# Patient Record
Sex: FEMALE | Race: BLACK OR AFRICAN AMERICAN | Employment: PART TIME | URBAN - METROPOLITAN AREA
[De-identification: names, ages, dates, MRNs, and addresses within clinical notes are randomized per-mention and may not be internally consistent; named-entity substitution may affect disease eponyms.]

---

## 2017-03-04 ENCOUNTER — ED HISTORICAL/CONVERTED ENCOUNTER (OUTPATIENT)
Dept: OTHER | Age: 22
End: 2017-03-04

## 2017-11-05 ENCOUNTER — IP HISTORICAL/CONVERTED ENCOUNTER (OUTPATIENT)
Dept: OTHER | Age: 22
End: 2017-11-05

## 2021-03-01 LAB
CHLAMYDIA, EXTERNAL: NEGATIVE
HBSAG, EXTERNAL: NEGATIVE
HIV, EXTERNAL: NEGATIVE
N. GONORRHEA, EXTERNAL: NEGATIVE
RPR, EXTERNAL: NON REACTIVE
RUBELLA, EXTERNAL: NORMAL
TYPE, ABO & RH, EXTERNAL: NORMAL

## 2021-04-28 ENCOUNTER — HOSPITAL ENCOUNTER (OUTPATIENT)
Dept: PERINATAL CARE | Age: 26
Discharge: HOME | End: 2021-04-28
Attending: OBSTETRICS & GYNECOLOGY
Payer: OTHER GOVERNMENT

## 2021-04-28 PROCEDURE — 76811 OB US DETAILED SNGL FETUS: CPT | Performed by: OBSTETRICS & GYNECOLOGY

## 2021-08-17 LAB — GRBS, EXTERNAL: NEGATIVE

## 2021-08-21 ENCOUNTER — HOSPITAL ENCOUNTER (EMERGENCY)
Age: 26
Discharge: HOME | End: 2021-08-21
Admitting: OBSTETRICS & GYNECOLOGY

## 2021-08-21 VITALS
TEMPERATURE: 98.6 F | HEIGHT: 67 IN | RESPIRATION RATE: 16 BRPM | OXYGEN SATURATION: 99 % | HEART RATE: 85 BPM | BODY MASS INDEX: 39.24 KG/M2 | WEIGHT: 250 LBS | DIASTOLIC BLOOD PRESSURE: 66 MMHG | SYSTOLIC BLOOD PRESSURE: 128 MMHG

## 2021-08-21 PROBLEM — O99.213 OBESITY COMPLICATING PREGNANCY IN THIRD TRIMESTER: Status: ACTIVE | Noted: 2021-08-21

## 2021-08-21 PROBLEM — O36.8130 DECREASED FETAL MOVEMENTS IN THIRD TRIMESTER: Status: ACTIVE | Noted: 2021-08-21

## 2021-08-21 PROBLEM — Z3A.37 37 WEEKS GESTATION OF PREGNANCY: Status: ACTIVE | Noted: 2021-08-21

## 2021-08-21 PROCEDURE — 59025 FETAL NON-STRESS TEST: CPT

## 2021-08-21 PROCEDURE — 99282 EMERGENCY DEPT VISIT SF MDM: CPT

## 2021-08-21 PROCEDURE — 75810000275 HC EMERGENCY DEPT VISIT NO LEVEL OF CARE

## 2021-08-21 PROCEDURE — 76815 OB US LIMITED FETUS(S): CPT

## 2021-08-21 NOTE — DISCHARGE SUMMARY
Obstetrical Discharge Summary     Name: Sridhar Kiran MRN: [id number]  SSN: [id number]    YOB: 1995  Age: 31 y.o. Sex: female      Allergies: Fish containing products and Indocin [indomethacin]    Admit Date: 8/21/2021    Discharge Date: 8/21/2021     Admitting Physician: Radha Hoffman     Attending Physician:Dr SIS Larose  * Admission Diagnoses: Active Hospital Problems    Diagnosis Date Noted    Decreased fetal movements in third trimester 08/21/2021     Priority: 1 - One     NST is reactive  AFV is normal  Fetal growth is good  BPP 10/10  I reassured the patient. Will discharge home  She will follow up as scheduled. She will return if fetal movements do not return to normal.      Obesity complicating pregnancy in third trimester 08/21/2021     Priority: 2 - Two    Body mass index 39.0-39.9, adult 08/21/2021     Priority: 2 - Two    37 weeks gestation of pregnancy 08/21/2021     Priority: 3 - Three       * Discharge Diagnoses: Active Hospital Problems    Diagnosis Date Noted    Decreased fetal movements in third trimester 08/21/2021     Priority: 1 - One     NST is reactive  AFV is normal  Fetal growth is good  BPP 10/10  I reassured the patient. Will discharge home  She will follow up as scheduled. She will return if fetal movements do not return to normal.      Obesity complicating pregnancy in third trimester 08/21/2021     Priority: 2 - Two    Body mass index 39.0-39.9, adult 08/21/2021     Priority: 2 - Two    37 weeks gestation of pregnancy 08/21/2021     Priority: 3 - Three         Additional Diagnoses:   Problem List as of 8/21/2021 Date Reviewed: 8/21/2021        Codes Class Noted - Resolved    Decreased fetal movements in third trimester ICD-10-CM: O36.8130  ICD-9-CM: 655.73  8/21/2021 - Present    Overview Signed 8/21/2021  3:51 PM by Jovon Rodriguez MD     NST is reactive  AFV is normal  Fetal growth is good  BPP 10/10  I reassured the patient.   Will discharge home  She will follow up as scheduled. She will return if fetal movements do not return to normal.             Obesity complicating pregnancy in third trimester ICD-10-CM: O99.213  ICD-9-CM: 649.13  8/21/2021 - Present        Body mass index 39.0-39.9, adult ICD-10-CM: Z68.39  ICD-9-CM: V85.39  8/21/2021 - Present        37 weeks gestation of pregnancy ICD-10-CM: Z3A.37  ICD-9-CM: V22.2  8/21/2021 - Present           No results found for: ABORH, RUBELLAEXT, GRBSEXT, ABORHEXT, RUBELLAEXT, GRBSEXT    Immunization(s):   There is no immunization history on file for this patient. * Procedures:NST, US with BPP  * No surgery found *           * Discharge Condition: stable    * Hospital Course: NST & BPP     * Disposition: Home    Discharge Medications: There are no discharge medications for this patient. * Follow-up Care/Patient Instructions: Activity: Activity as tolerated  Diet: Regular Diet  Wound Care: None needed  Instructions: NST is reactive  AFV is normal  Fetal growth is good  BPP 10/10  I reassured the patient. Will discharge home  She will follow up as scheduled.   She will return if fetal movements do not return to normal.    Follow-up Information     Follow up With Specialties Details Why Contact Info    Unknown, Provider, MD    Patient not available to ask

## 2021-08-21 NOTE — DISCHARGE INSTRUCTIONS
Patient Education   Patient Education        Counting Your Baby's Kicks: Care Instructions  Your Care Instructions     Counting your baby's kicks is one way your doctor can tell that your baby is healthy. Most women--especially in a first pregnancy--feel their baby move for the first time between 16 and 22 weeks. The movement may feel like flutters rather than kicks. Your baby may move more at certain times of the day. When you are active, you may notice less kicking than when you are resting. At your prenatal visits, your doctor will ask whether the baby is active. In your last trimester, your doctor may ask you to count the number of times you feel your baby move. Follow-up care is a key part of your treatment and safety. Be sure to make and go to all appointments, and call your doctor if you are having problems. It's also a good idea to know your test results and keep a list of the medicines you take. How do you count fetal kicks? · A common method of checking your baby's movement is to count the number of kicks or moves you feel in 1 hour. Ten movements (such as kicks, flutters, or rolls) in 1 hour are normal. Some doctors suggest that you count in the morning until you get to 10 movements. Then you can quit for that day and start again the next day. · Pick your baby's most active time of day to count. This may be any time from morning to evening. · If you do not feel 10 movements in an hour, your baby may be sleeping. Wait for the next hour and count again. When should you call for help? Call your doctor now or seek immediate medical care if:    · You noticed that your baby has stopped moving or is moving much less than normal.   Watch closely for changes in your health, and be sure to contact your doctor if you have any problems. Where can you learn more?   Go to http://www.gray.com/  Enter [id number] in the search box to learn more about \"Counting Your Baby's Kicks: Care Instructions. \"  Current as of: October 8, 2020               Content Version: 12.8  © 0861-7442 [employer]. Care instructions adapted under license by [employer] (which disclaims liability or warranty for this information). If you have questions about a medical condition or this instruction, always ask your healthcare professional. [address] any warranty or liability for your use of this information. Early Stage of Labor at Home: Care Instructions  Overview     If you came to the hospital while in early labor, your doctor may ask you to labor at home until your contractions are stronger. Many women stay at home during early labor. This is often the longest part of the birthing process. It may last up to 2 to 3 days. Contractions are mild to moderate and shorter (about 30 to 45 seconds). You can usually keep talking during them. Contractions may also be irregular, about 5 to 20 minutes apart. They may even stop for a while. It helps to stay as relaxed as you can during this time. You can spend some or all of your early labor at home or anywhere else you may be comfortable. If you live far from the hospital or birthing center, you may want to think about going somewhere nearby so you can get back to the hospital quickly. For some women, there may be benefits to staying home during early labor, such as avoiding medicines or procedures. As labor progresses, you'll shift from early labor to active labor. During this time, contractions get more intense. They occur more often, about every 2 to 3 minutes. They also last longer, about 50 to 70 seconds. You will feel them even when you change positions and walk or move around. It may be hard to tell if you are in active labor. If you aren't sure, call your doctor or midwife. As your labor progresses, check in with your doctor or midwife about when to come back to the hospital or birthing center.  You may have special instructions if your water broke or you tested positive for group B strep. Follow-up care is a key part of your treatment and safety. Be sure to make and go to all appointments, and call your doctor if you are having problems. It's also a good idea to know your test results and keep a list of the medicines you take. How can you care for yourself at home? · Get support. Having a support person with you from early labor until after childbirth can have a positive effect on childbirth. · Find distractions. During early labor, you can walk, play cards, watch TV, or listen to music to help take your mind off your contractions. · Ask your partner, labor , or  for a massage. Shoulder and low back massage during contractions may ease your pain. Strong massage of the back muscles (counterpressure) during contractions may help relieve the pain of back labor. Tell your labor  exactly where to push and how hard to push. · Use imagery. This means using your imagination to decrease your pain. For instance, to help manage pain, picture your contractions as waves rolling over you. Picture a peaceful place, such as a beach or mountain stream, to help you relax between contractions. · Change positions during labor. Walking, kneeling, or sitting on a big rubber ball (birth ball) are good options. · Use focused breathing techniques. Breathing in a rhythm can distract you from pain. · Take a warm shower or bath. Warm water may ease pain and stress. When should you call for help? Call  911 anytime you think you may need emergency care. For example, call if:    · You passed out (lost consciousness).     · You have a seizure.     · You have severe vaginal bleeding.     · You have severe pain in your belly or pelvis that doesn't get better between contractions.     · You have had fluid gushing or leaking from your vagina and you know or think the umbilical cord is bulging into your vagina.  If this happens, immediately get down on your knees so your rear end (buttocks) is higher than your head. This will decrease the pressure on the cord until help arrives. Call your doctor now or seek immediate medical care if:    · You have new or worse signs of preeclampsia, such as:  ? Sudden swelling of your face, hands, or feet. ? New vision problems (such as dimness, blurring, or seeing spots). ? A severe headache.     · You have any vaginal bleeding.     · You have belly pain or cramping.     · You have a fever.     · You have had regular contractions (with or without pain) for an hour. This means that you have 8 or more within 1 hour or 4 or more in 20 minutes after you change your position and drink fluids.     · You have a sudden release of fluid from your vagina.     · You have low back pain or pelvic pressure that does not go away.     · You notice that your baby has stopped moving or is moving much less than normal.   Watch closely for changes in your health, and be sure to contact your doctor if you have any problems. Where can you learn more? Go to http://www.gray.com/  Enter [id number] in the search box to learn more about \"Early Stage of Labor at Home: Care Instructions. \"  Current as of: October 8, 2020               Content Version: 12.8  © 2006-2021 [employer]. Care instructions adapted under license by [employer] (which disclaims liability or warranty for this information). If you have questions about a medical condition or this instruction, always ask your healthcare professional. [place] [address] any warranty or liability for your use of this information.

## 2021-08-21 NOTE — PROGRESS NOTES
1539 This RN giving pt written and verbal d/c instructions. Pt verbalized understanding and ambulated off the unit in stable condition.

## 2021-08-21 NOTE — PROGRESS NOTES
1400: Patient arrived to L&D room 209, pt reports no fetal movement in the last 2 hours. Pt oriented to room and unit. Plan of care discussed with pt. Pt verbalized understanding. 1405: Pt reporting feeling the baby move. Pt given button to push when she feels fetal movement. Pt also given ice water to PO hydrate. 1445: Dr. Felder given update on EFM tracing with late decel x1. MD reviewing EFM tracing at this time. Per MD will continue to monitor pt for now. 1510: Dr. Felder at bedside to assess pt.     1530: BPP 8/8 completed by Dr. Felder with bedside ultrasound. MD stated pt can be discharged home at this time.

## 2021-08-21 NOTE — H&P
History & Physical    Name: Guillermo Barth MRN: [id number]  SSN: [id number]    YOB: 1995  Age: 31 y.o. Sex: female        Subjective:   Chief Complaint: Decreased fetal movement  Estimated Date of Delivery: None noted. OB History    Para Term  AB Living   2 1 1     1   SAB TAB Ectopic Molar Multiple Live Births             1      # Outcome Date GA Lbr Morris/2nd Weight Sex Delivery Anes PTL Lv   2 Current            1 Term  36w0d   F Vag-Spont EPI N RICKEY       Teresa [place], 31 y.o.,  ,  presents at Unknown, complaining of Decreased fetal movement. Today, she noticed decreased fetal movement. The fetus did not move for two hours. She tried to stimulate it, but only got a small response. She hears the fetal heart rate, and is reassured, but still the movements are not as strong as usual.  I reviewed the prenatal record. Prenatal course was normal. Please see prenatal records for details. The only other complaint is that her right had feels swollen, and two fingers itch. No rash is present. Her palms do not itch. Allergies   Allergen Reactions    Fish Containing Products Anaphylaxis    Indocin [Indomethacin] Hives and Swelling       Prior to Admission medications    Not on File       History reviewed. No pertinent past medical history. Past Surgical History:   Procedure Laterality Date    HX CYST REMOVAL Right 2018       Social History     Occupational History    Not on file   Tobacco Use    Smoking status: Never Smoker    Smokeless tobacco: Never Used   Substance and Sexual Activity    Alcohol use: Not Currently    Drug use: Never    Sexual activity: Not on file       History reviewed. No pertinent family history. Review of Systems: A comprehensive review of systems was negative except for that written in the HPI.     Objective:     Physical Exam:    Visit Vitals  /66   Pulse 85   Temp 98.6 °F (37 °C)   Resp 16   Ht 5' 7\" (1.702 m)   Wt 113.4 kg (250 lb)   SpO2 99%   BMI 39.16 kg/m²       General:   31 y.o.  female who appears to be in no acute distress. HEENT:   Normocephalic. Pupils are equal, round, and reactive to light. Extraocular movements are intact. Pharynx is clear. Neck:   Normal range of motion. No thyromegaly. Abdomen:   Soft, gravid, not tender, normal bowel sounds. No CVA tenderness   Leopold's: vertex   EFW 8 pounds   Uterine Activity:    Frequency: Occasional    Duration: 60 seconds    Intensity: Mild  Fetal Heart Rate:     Baseline: 150 bpm    Variability: Moderate    Accelerations: Present (15 x 15 bpm)    Decelerations: None  Category: 1   Nonstress test is reactive  US confirms a single live fetus, in cephalic presentation with spine to maternal left. Amniotic fluid volume is normal.  Placenta is anterior and clear of the cervix. BPD 8.90 CMS 36W 0D  HC  33.24 CM 37W 6D  AC  35.54 CM 39W 0D  FL 8.20 CM 42W 0D  EFW 3698 GRAMS 89 percentile    BPP: 10/10      Pelvic:    Membranes: Intact  Extremites:   Trace bilateral pedal edema. Full range of motion. Neuro:    Alert. Oriented. CN 2 - 12 intact. Motor and sensory exam grossly normal.      Prenatal Labs   No results found for: ABORH, RUBELLAEXT, GRBSEXT, HBSAGEXT, HIVEXT, RPREXT, GONNOEXT, CHLAMEXT, ABORHEXT, RUBELLAEXT, GRBSEXT, HBSAGEXT, HIVEXT, RPREXT, GONNOEXT, CHLAMEXT, ABORHEXT, RUBELLAEXT, GRBSEXT, HBSAGEXT, HIVEXT, RPREXT, GONNOEXT, CHLAMEXT  No results found for this or any previous visit (from the past 12 hour(s)). Assessment/Plan:     Active Hospital Problems    Diagnosis Date Noted    Decreased fetal movements in third trimester 2021     Priority: 1 - One     NST is reactive  AFV is normal  Fetal growth is good  BPP 10/10  I reassured the patient. Will discharge home  She will follow up as scheduled.   She will return if fetal movements do not return to normal.      Obesity complicating pregnancy in third trimester 2021 Priority: 2 - Two    Body mass index 39.0-39.9, adult 08/21/2021     Priority: 2 - Two    37 weeks gestation of pregnancy 08/21/2021     Priority: 3 - Three       Benedicto Veronica MD  8/21/2021

## 2021-08-31 ENCOUNTER — ANESTHESIA (OUTPATIENT)
Dept: LABOR AND DELIVERY | Age: 26
End: 2021-08-31

## 2021-08-31 ENCOUNTER — ANESTHESIA EVENT (OUTPATIENT)
Dept: LABOR AND DELIVERY | Age: 26
End: 2021-08-31

## 2021-08-31 ENCOUNTER — HOSPITAL ENCOUNTER (INPATIENT)
Age: 26
LOS: 3 days | Discharge: HOME | End: 2021-09-03
Attending: OBSTETRICS & GYNECOLOGY | Admitting: OBSTETRICS & GYNECOLOGY

## 2021-08-31 DIAGNOSIS — R52 POSTPARTUM PAIN: Primary | ICD-10-CM

## 2021-08-31 PROBLEM — O47.9 UTERINE CONTRACTIONS DURING PREGNANCY: Status: ACTIVE | Noted: 2021-08-31

## 2021-08-31 PROBLEM — Z34.90 PREGNANT: Status: ACTIVE | Noted: 2021-08-31

## 2021-08-31 LAB
ALT SERPL-CCNC: 16 U/L (ref 12–78)
APPEARANCE UR: CLEAR
AST SERPL-CCNC: 11 U/L (ref 15–37)
BACTERIA URNS QL MICRO: ABNORMAL /HPF
BILIRUB SERPL-MCNC: 0.5 MG/DL (ref 0.2–1)
BILIRUB UR QL: NEGATIVE
COLOR UR: ABNORMAL
COVID-19 RAPID TEST, COVR: NOT DETECTED
CREAT SERPL-MCNC: 0.46 MG/DL (ref 0.55–1.02)
CREAT UR-MCNC: 67 MG/DL
EPITH CASTS URNS QL MICRO: ABNORMAL /LPF
ERYTHROCYTE [DISTWIDTH] IN BLOOD BY AUTOMATED COUNT: 13.4 % (ref 11.5–14.5)
GLUCOSE UR STRIP.AUTO-MCNC: NEGATIVE MG/DL
HCT VFR BLD AUTO: 35.6 % (ref 35–47)
HGB BLD-MCNC: 11.4 G/DL (ref 11.5–16)
HGB UR QL STRIP: ABNORMAL
HYALINE CASTS URNS QL MICRO: ABNORMAL /LPF (ref 0–5)
KETONES UR QL STRIP.AUTO: NEGATIVE MG/DL
LDH SERPL L TO P-CCNC: 182 U/L (ref 81–246)
LEUKOCYTE ESTERASE UR QL STRIP.AUTO: ABNORMAL
MCH RBC QN AUTO: 28.2 PG (ref 26–34)
MCHC RBC AUTO-ENTMCNC: 32 G/DL (ref 30–36.5)
MCV RBC AUTO: 88.1 FL (ref 80–99)
NITRITE UR QL STRIP.AUTO: NEGATIVE
NRBC # BLD: 0 K/UL (ref 0–0.01)
NRBC BLD-RTO: 0 PER 100 WBC
PH UR STRIP: 6.5 [PH] (ref 5–8)
PLATELET # BLD AUTO: 192 K/UL (ref 150–400)
PMV BLD AUTO: 11.2 FL (ref 8.9–12.9)
PROT UR STRIP-MCNC: NEGATIVE MG/DL
PROT UR-MCNC: 13 MG/DL (ref 0–11.9)
PROT/CREAT UR-RTO: 0.2
RBC # BLD AUTO: 4.04 M/UL (ref 3.8–5.2)
RBC #/AREA URNS HPF: ABNORMAL /HPF (ref 0–5)
SOURCE, COVRS: NORMAL
SP GR UR REFRACTOMETRY: 1.01 (ref 1–1.03)
UA: UC IF INDICATED,UAUC: ABNORMAL
URATE SERPL-MCNC: 3.7 MG/DL (ref 2.6–6)
UROBILINOGEN UR QL STRIP.AUTO: 0.2 EU/DL (ref 0.2–1)
WBC # BLD AUTO: 11.1 K/UL (ref 3.6–11)
WBC URNS QL MICRO: ABNORMAL /HPF (ref 0–4)

## 2021-08-31 PROCEDURE — 65270000029 HC RM PRIVATE

## 2021-08-31 PROCEDURE — 75410000002 HC LABOR FEE PER 1 HR: Performed by: OBSTETRICS & GYNECOLOGY

## 2021-08-31 PROCEDURE — 83615 LACTATE (LD) (LDH) ENZYME: CPT

## 2021-08-31 PROCEDURE — 81001 URINALYSIS AUTO W/SCOPE: CPT

## 2021-08-31 PROCEDURE — 74011250636 HC RX REV CODE- 250/636: Performed by: ANESTHESIOLOGY

## 2021-08-31 PROCEDURE — 76060000078 HC EPIDURAL ANESTHESIA: Performed by: ANESTHESIOLOGY

## 2021-08-31 PROCEDURE — 82565 ASSAY OF CREATININE: CPT

## 2021-08-31 PROCEDURE — 85027 COMPLETE CBC AUTOMATED: CPT

## 2021-08-31 PROCEDURE — 77030014125 HC TY EPDRL BBMI -B: Performed by: ANESTHESIOLOGY

## 2021-08-31 PROCEDURE — 77030005513 HC CATH URETH FOL11 MDII -B

## 2021-08-31 PROCEDURE — 82247 BILIRUBIN TOTAL: CPT

## 2021-08-31 PROCEDURE — 36415 COLL VENOUS BLD VENIPUNCTURE: CPT

## 2021-08-31 PROCEDURE — 74011000250 HC RX REV CODE- 250: Performed by: ANESTHESIOLOGY

## 2021-08-31 PROCEDURE — 87635 SARS-COV-2 COVID-19 AMP PRB: CPT

## 2021-08-31 PROCEDURE — 82570 ASSAY OF URINE CREATININE: CPT

## 2021-08-31 PROCEDURE — 00HU33Z INSERTION OF INFUSION DEVICE INTO SPINAL CANAL, PERCUTANEOUS APPROACH: ICD-10-PCS | Performed by: ANESTHESIOLOGY

## 2021-08-31 PROCEDURE — 84450 TRANSFERASE (AST) (SGOT): CPT

## 2021-08-31 PROCEDURE — 84460 ALANINE AMINO (ALT) (SGPT): CPT

## 2021-08-31 PROCEDURE — 84550 ASSAY OF BLOOD/URIC ACID: CPT

## 2021-08-31 PROCEDURE — 74011250636 HC RX REV CODE- 250/636: Performed by: OBSTETRICS & GYNECOLOGY

## 2021-08-31 PROCEDURE — 77010026065 HC OXYGEN MINIMUM MEDICAL AIR: Performed by: OBSTETRICS & GYNECOLOGY

## 2021-08-31 RX ORDER — FENTANYL/BUPIVACAINE/NS/PF 2-1250MCG
1-16 PREFILLED PUMP RESERVOIR EPIDURAL CONTINUOUS
Status: DISCONTINUED | OUTPATIENT
Start: 2021-09-01 | End: 2021-09-01 | Stop reason: HOSPADM

## 2021-08-31 RX ORDER — SODIUM CHLORIDE, SODIUM LACTATE, POTASSIUM CHLORIDE, CALCIUM CHLORIDE 600; 310; 30; 20 MG/100ML; MG/100ML; MG/100ML; MG/100ML
125 INJECTION, SOLUTION INTRAVENOUS CONTINUOUS
Status: DISCONTINUED | OUTPATIENT
Start: 2021-08-31 | End: 2021-09-02

## 2021-08-31 RX ORDER — SODIUM CHLORIDE 0.9 % (FLUSH) 0.9 %
5-40 SYRINGE (ML) INJECTION EVERY 8 HOURS
Status: DISCONTINUED | OUTPATIENT
Start: 2021-08-31 | End: 2021-09-02

## 2021-08-31 RX ORDER — LIDOCAINE HYDROCHLORIDE 10 MG/ML
INJECTION INFILTRATION; PERINEURAL AS NEEDED
Status: DISCONTINUED | OUTPATIENT
Start: 2021-08-31 | End: 2021-09-01 | Stop reason: HOSPADM

## 2021-08-31 RX ORDER — OXYTOCIN/RINGER'S LACTATE 30/500 ML
87.3 PLASTIC BAG, INJECTION (ML) INTRAVENOUS AS NEEDED
Status: DISCONTINUED | OUTPATIENT
Start: 2021-08-31 | End: 2021-09-03 | Stop reason: HOSPADM

## 2021-08-31 RX ORDER — BUPIVACAINE HYDROCHLORIDE 2.5 MG/ML
INJECTION, SOLUTION EPIDURAL; INFILTRATION; INTRACAUDAL AS NEEDED
Status: DISCONTINUED | OUTPATIENT
Start: 2021-08-31 | End: 2021-09-01 | Stop reason: HOSPADM

## 2021-08-31 RX ORDER — MAG HYDROX/ALUMINUM HYD/SIMETH 200-200-20
30 SUSPENSION, ORAL (FINAL DOSE FORM) ORAL
Status: DISCONTINUED | OUTPATIENT
Start: 2021-09-01 | End: 2021-09-01 | Stop reason: HOSPADM

## 2021-08-31 RX ORDER — LIDOCAINE HYDROCHLORIDE AND EPINEPHRINE 15; 5 MG/ML; UG/ML
INJECTION, SOLUTION EPIDURAL AS NEEDED
Status: DISCONTINUED | OUTPATIENT
Start: 2021-08-31 | End: 2021-09-01 | Stop reason: HOSPADM

## 2021-08-31 RX ORDER — OXYTOCIN/RINGER'S LACTATE 30/500 ML
10 PLASTIC BAG, INJECTION (ML) INTRAVENOUS AS NEEDED
Status: DISCONTINUED | OUTPATIENT
Start: 2021-08-31 | End: 2021-09-03 | Stop reason: HOSPADM

## 2021-08-31 RX ORDER — OXYTOCIN/RINGER'S LACTATE 30/500 ML
0-20 PLASTIC BAG, INJECTION (ML) INTRAVENOUS
Status: DISCONTINUED | OUTPATIENT
Start: 2021-08-31 | End: 2021-09-02

## 2021-08-31 RX ORDER — NALOXONE HYDROCHLORIDE 0.4 MG/ML
0.4 INJECTION, SOLUTION INTRAMUSCULAR; INTRAVENOUS; SUBCUTANEOUS AS NEEDED
Status: DISCONTINUED | OUTPATIENT
Start: 2021-08-31 | End: 2021-09-01 | Stop reason: HOSPADM

## 2021-08-31 RX ORDER — SODIUM CHLORIDE 0.9 % (FLUSH) 0.9 %
5-40 SYRINGE (ML) INJECTION AS NEEDED
Status: DISCONTINUED | OUTPATIENT
Start: 2021-08-31 | End: 2021-09-03 | Stop reason: HOSPADM

## 2021-08-31 RX ORDER — SODIUM CHLORIDE, SODIUM LACTATE, POTASSIUM CHLORIDE, CALCIUM CHLORIDE 600; 310; 30; 20 MG/100ML; MG/100ML; MG/100ML; MG/100ML
125 INJECTION, SOLUTION INTRAVENOUS CONTINUOUS
Status: DISCONTINUED | OUTPATIENT
Start: 2021-09-01 | End: 2021-09-01 | Stop reason: HOSPADM

## 2021-08-31 RX ORDER — EPHEDRINE SULFATE/0.9% NACL/PF 50 MG/5 ML
10 SYRINGE (ML) INTRAVENOUS
Status: DISCONTINUED | OUTPATIENT
Start: 2021-08-31 | End: 2021-09-01 | Stop reason: HOSPADM

## 2021-08-31 RX ADMIN — LIDOCAINE HYDROCHLORIDE AND EPINEPHRINE 3 ML: 15; 5 INJECTION, SOLUTION EPIDURAL at 23:22

## 2021-08-31 RX ADMIN — SODIUM CHLORIDE, POTASSIUM CHLORIDE, SODIUM LACTATE AND CALCIUM CHLORIDE 125 ML/HR: 600; 310; 30; 20 INJECTION, SOLUTION INTRAVENOUS at 23:29

## 2021-08-31 RX ADMIN — OXYTOCIN 2 MILLI-UNITS/MIN: 10 INJECTION, SOLUTION INTRAMUSCULAR; INTRAVENOUS at 22:17

## 2021-08-31 RX ADMIN — BUPIVACAINE HYDROCHLORIDE 10 ML: 2.5 INJECTION, SOLUTION EPIDURAL; INFILTRATION; INTRACAUDAL; PERINEURAL at 23:25

## 2021-08-31 RX ADMIN — LIDOCAINE HYDROCHLORIDE 3 ML: 10 INJECTION, SOLUTION INFILTRATION; PERINEURAL at 23:18

## 2021-08-31 RX ADMIN — SODIUM CHLORIDE, POTASSIUM CHLORIDE, SODIUM LACTATE AND CALCIUM CHLORIDE 1000 ML: 600; 310; 30; 20 INJECTION, SOLUTION INTRAVENOUS at 20:11

## 2021-08-31 RX ADMIN — SODIUM CHLORIDE, POTASSIUM CHLORIDE, SODIUM LACTATE AND CALCIUM CHLORIDE 125 ML/HR: 600; 310; 30; 20 INJECTION, SOLUTION INTRAVENOUS at 21:12

## 2021-08-31 RX ADMIN — Medication 12 ML/HR: at 23:38

## 2021-08-31 NOTE — PROGRESS NOTES
1856- Verbal shift change report given to LICHA Darden RN (oncoming nurse) by Enedelia Granados RN (offgoing nurse). Report included the following information SBAR, Intake/Output, MAR and Recent Results. Lobito Grimm 90 notified of pt's arrival and the lates on the NST. MD to be at bedside soon for assessment. Plan is to start pitocin at some point this evening and titrate up slowly     2040Goran Verde MD at bedside for assessment    2045- SVE per MD 3/60/-3. Plan is to monitor pt until 2200 and if strip is reassuring, start pitocin and titrate slowly by 2 an hour. 2050- This RN educates pt on fetal tracing. Pt given opportunity to voice questions and concerns     2052- EFM discontinued, pt ambulates to restroom    2057- EFM reconnected and pt is back in bed laying on lateral left side     2202- EFM discontinued and pt ambulates to restroom    2211- EFM reconnected and pt back in bed laying on lateral left side    2218- Pitocin started at this time and bolus started     2308- EFM discontinued and pt ambulates to the bathroom     2312- EFM reconnected and pt sitting up on side of bed for epidural placement. This RN attempts to monitor baby     2312Isidogabriella Mott MD at bedside for epidural placement    2317- Time out     2320- Test dose     2325- Pt laid back in bed with bump under left side     0003- Pt turned to left side     0045Consuella Patron at bedside for assessment. SVE per MD 5/70/-2. MD states to continue increasing pitocin as tolerated    0122- Pitocin turned off     Avni Espinoza MD updated on pt's strip and the discontinuation of pitocin    0250- Pads changed, bloody show noted. SVE per this RN 7-8/90/0 with a swollen anterior/right lip.  This RN will speak to MD about IV benadryl    0252- Pt turned on far right side    0303- MD notified of pt's exam. This RN requests IV benadryl and receives verbal order for 25 IV benadryl     0313- IV benadryl administered     0342- Side lying hip release on lateral right side, oxygen applied to mother and fluid bolus given     0353- MD called and notified on interventions performed on pt. MD to bedside momentarily for assessment     0356- SVE per MD 10/100/+3    0359- Pt begins pushing    0401- Shoulder dystocia recognized. Pt put in Rina and suprabpubic pressure applied by this RN    0402- Anterior shoulder delivered and delivery of viable infant female with terminal meconium at this time    0403- Cord is clamped and cut at this time    0406- Delivery of placenta     0410- 1st degree and supra periurethral repaired    0420- MD exits bedside    0425- Pads changed and saritha care applied. Pt repositioned in bed    0610- SBAR given to AYDE Costello RN

## 2021-09-01 PROCEDURE — 65270000029 HC RM PRIVATE

## 2021-09-01 PROCEDURE — 74011250637 HC RX REV CODE- 250/637: Performed by: NURSE PRACTITIONER

## 2021-09-01 PROCEDURE — 10907ZC DRAINAGE OF AMNIOTIC FLUID, THERAPEUTIC FROM PRODUCTS OF CONCEPTION, VIA NATURAL OR ARTIFICIAL OPENING: ICD-10-PCS | Performed by: OBSTETRICS & GYNECOLOGY

## 2021-09-01 PROCEDURE — 2709999900 HC NON-CHARGEABLE SUPPLY

## 2021-09-01 PROCEDURE — 75410000003 HC RECOV DEL/VAG/CSECN EA 0.5 HR: Performed by: OBSTETRICS & GYNECOLOGY

## 2021-09-01 PROCEDURE — 74011250637 HC RX REV CODE- 250/637: Performed by: OBSTETRICS & GYNECOLOGY

## 2021-09-01 PROCEDURE — 3E033VJ INTRODUCTION OF OTHER HORMONE INTO PERIPHERAL VEIN, PERCUTANEOUS APPROACH: ICD-10-PCS | Performed by: OBSTETRICS & GYNECOLOGY

## 2021-09-01 PROCEDURE — 0KQM0ZZ REPAIR PERINEUM MUSCLE, OPEN APPROACH: ICD-10-PCS | Performed by: OBSTETRICS & GYNECOLOGY

## 2021-09-01 PROCEDURE — 75410000002 HC LABOR FEE PER 1 HR: Performed by: OBSTETRICS & GYNECOLOGY

## 2021-09-01 PROCEDURE — 75410000000 HC DELIVERY VAGINAL/SINGLE: Performed by: OBSTETRICS & GYNECOLOGY

## 2021-09-01 PROCEDURE — 74011250636 HC RX REV CODE- 250/636: Performed by: OBSTETRICS & GYNECOLOGY

## 2021-09-01 RX ORDER — DIPHENHYDRAMINE HYDROCHLORIDE 50 MG/ML
12.5 INJECTION, SOLUTION INTRAMUSCULAR; INTRAVENOUS ONCE
Status: COMPLETED | OUTPATIENT
Start: 2021-09-01 | End: 2021-09-01

## 2021-09-01 RX ORDER — DIPHENHYDRAMINE HCL 25 MG
25 CAPSULE ORAL
Status: DISCONTINUED | OUTPATIENT
Start: 2021-09-01 | End: 2021-09-03 | Stop reason: HOSPADM

## 2021-09-01 RX ORDER — HYDROCODONE BITARTRATE AND ACETAMINOPHEN 5; 325 MG/1; MG/1
2 TABLET ORAL
Status: DISCONTINUED | OUTPATIENT
Start: 2021-09-01 | End: 2021-09-01 | Stop reason: ALTCHOICE

## 2021-09-01 RX ORDER — ACETAMINOPHEN 325 MG/1
650 TABLET ORAL
Status: DISCONTINUED | OUTPATIENT
Start: 2021-09-01 | End: 2021-09-03 | Stop reason: HOSPADM

## 2021-09-01 RX ORDER — OXYTOCIN/RINGER'S LACTATE 30/500 ML
87.3 PLASTIC BAG, INJECTION (ML) INTRAVENOUS AS NEEDED
Status: DISCONTINUED | OUTPATIENT
Start: 2021-09-01 | End: 2021-09-03 | Stop reason: HOSPADM

## 2021-09-01 RX ORDER — OXYTOCIN/RINGER'S LACTATE 30/500 ML
10 PLASTIC BAG, INJECTION (ML) INTRAVENOUS AS NEEDED
Status: DISCONTINUED | OUTPATIENT
Start: 2021-09-01 | End: 2021-09-03 | Stop reason: HOSPADM

## 2021-09-01 RX ORDER — ONDANSETRON 4 MG/1
4 TABLET, ORALLY DISINTEGRATING ORAL
Status: ACTIVE | OUTPATIENT
Start: 2021-09-01 | End: 2021-09-02

## 2021-09-01 RX ORDER — ZOLPIDEM TARTRATE 5 MG/1
5 TABLET ORAL
Status: DISCONTINUED | OUTPATIENT
Start: 2021-09-01 | End: 2021-09-03 | Stop reason: HOSPADM

## 2021-09-01 RX ORDER — MORPHINE SULFATE 2 MG/ML
2 INJECTION, SOLUTION INTRAMUSCULAR; INTRAVENOUS
Status: DISCONTINUED | OUTPATIENT
Start: 2021-09-01 | End: 2021-09-03 | Stop reason: HOSPADM

## 2021-09-01 RX ORDER — HYDROCORTISONE ACETATE PRAMOXINE HCL 2.5; 1 G/100G; G/100G
CREAM TOPICAL AS NEEDED
Status: DISCONTINUED | OUTPATIENT
Start: 2021-09-01 | End: 2021-09-01

## 2021-09-01 RX ORDER — OXYCODONE HYDROCHLORIDE 5 MG/1
5 TABLET ORAL
Status: DISCONTINUED | OUTPATIENT
Start: 2021-09-01 | End: 2021-09-03 | Stop reason: HOSPADM

## 2021-09-01 RX ORDER — NALOXONE HYDROCHLORIDE 0.4 MG/ML
0.4 INJECTION, SOLUTION INTRAMUSCULAR; INTRAVENOUS; SUBCUTANEOUS AS NEEDED
Status: DISCONTINUED | OUTPATIENT
Start: 2021-09-01 | End: 2021-09-03 | Stop reason: HOSPADM

## 2021-09-01 RX ORDER — IBUPROFEN 800 MG/1
800 TABLET ORAL EVERY 8 HOURS
Status: DISCONTINUED | OUTPATIENT
Start: 2021-09-01 | End: 2021-09-01

## 2021-09-01 RX ADMIN — OXYCODONE 5 MG: 5 TABLET ORAL at 21:56

## 2021-09-01 RX ADMIN — OXYTOCIN 250 MILLI-UNITS/MIN: 10 INJECTION, SOLUTION INTRAMUSCULAR; INTRAVENOUS at 04:34

## 2021-09-01 RX ADMIN — ALUMINUM HYDROXIDE, MAGNESIUM HYDROXIDE, AND SIMETHICONE 30 ML: 200; 200; 20 SUSPENSION ORAL at 08:18

## 2021-09-01 RX ADMIN — ACETAMINOPHEN 650 MG: 325 TABLET ORAL at 08:18

## 2021-09-01 RX ADMIN — ACETAMINOPHEN 650 MG: 325 TABLET ORAL at 15:09

## 2021-09-01 RX ADMIN — DIPHENHYDRAMINE HYDROCHLORIDE 12.5 MG: 50 INJECTION, SOLUTION INTRAMUSCULAR; INTRAVENOUS at 03:13

## 2021-09-01 RX ADMIN — ACETAMINOPHEN 650 MG: 325 TABLET ORAL at 19:56

## 2021-09-01 NOTE — PROGRESS NOTES
1100: Pt up to bathroom with minimal assistance; pt voided without difficulty. Pericare done at this time. Pt back to bed; no compliants at this time.

## 2021-09-01 NOTE — ANESTHESIA PREPROCEDURE EVALUATION
Relevant Problems   ENDOCRINE   (+) Obesity complicating pregnancy in third trimester       Anesthetic History   No history of anesthetic complications            Review of Systems / Medical History  Patient summary reviewed and pertinent labs reviewed    Pulmonary  Within defined limits                 Neuro/Psych   Within defined limits           Cardiovascular  Within defined limits                     GI/Hepatic/Renal  Within defined limits              Endo/Other  Within defined limits           Other Findings   Comments: IOL           Physical Exam    Airway  Mallampati: II           Cardiovascular               Dental         Pulmonary                 Abdominal         Other Findings            Anesthetic Plan    ASA: 2  Anesthesia type: epidural          Induction: Intravenous  Anesthetic plan and risks discussed with: Patient

## 2021-09-01 NOTE — H&P
OB History & Physical    Name: Jonathan Pizarro MRN: [id number]  SSN: [id number]    YOB: 1995  Age: 31 y.o. Sex: female      Subjective:     Reason for Admission:  Pregnancy and decreased movement. History of Present Illness: Jonathan Pizarro is a 31 y.o.  female [id number] an estimated gestational age of 36w4d with Estimated Date of Delivery: 21. Patient complains of decreased fetal movement and irregular contractions Q5-7 minutes. Denied SROM or vaginal bleeding. A BPP was done which was . Dr. Rosie Nazario sent patient in for induction of labor. OB History        2    Para   1    Term   1            AB        Living   1       SAB        TAB        Ectopic        Molar        Multiple        Live Births   1              No past medical history on file. Past Surgical History:   Procedure Laterality Date    HX CYST REMOVAL Right 2018    HX OTHER SURGICAL      wisdom teeth removal     HX OTHER SURGICAL      cyst removal      Social History     Occupational History    Not on file   Tobacco Use    Smoking status: Never Smoker    Smokeless tobacco: Never Used   Substance and Sexual Activity    Alcohol use: Not Currently    Drug use: Never    Sexual activity: Not on file     No family history on file. Allergies   Allergen Reactions    Fish Containing Products Anaphylaxis    Indocin [Indomethacin] Hives and Swelling     Prior to Admission medications    Medication Sig Start Date End Date Taking? Authorizing Provider   PNV Comb #2-Iron-FA-Omega 3 29-1-400 mg cmpk Take  by mouth. Yes Provider, Historical   Iron BisGl &PS [id number] mg-mg-mcg-mg cap Take  by mouth. Yes Provider, Historical        Review of Systems-see HPI    Objective:     Vitals:    Vitals:    21 19121   BP: 129/77    Pulse: 85    Weight:  116.1 kg (256 lb)   Height:  5' 7\" (1.702 m)      No data recorded.     BP  Min: 129/77  Max: 129/77     Physical Exam  General: in NAD  HEENT: normocephalic  Back: no CVAT  Abdomen: Gravid, soft, nontender, palpable contractions, term size  Fundus: soft, nontender  Extremities: no abnormal cyanosis, clubbing, edema  Skin: warm, dry, no abnormal lesions noted  Pelvic:Cervical Exam: 60/3, -3, VTX  Uterine Activity: Q 7 minutes  Membranes: no gross evidence of ROM  Fetal Heart Rate: adequate variability and reactivity; occasional variable decelerations    Labs:   Recent Results (from the past 24 hour(s))   CBC W/O DIFF    Collection Time: 08/31/21  7:50 PM   Result Value Ref Range    WBC 11.1 (H) 3.6 - 11.0 K/uL    RBC 4.04 3.80 - 5.20 M/uL    HGB 11.4 (L) 11.5 - 16.0 g/dL    HCT 35.6 35.0 - 47.0 %    MCV 88.1 80.0 - 99.0 FL    MCH 28.2 26.0 - 34.0 PG    MCHC 32.0 30.0 - 36.5 g/dL    RDW 13.4 11.5 - 14.5 %    PLATELET 658 474 - 449 K/uL    MPV 11.2 8.9 - 12.9 FL    NRBC 0.0 0  WBC    ABSOLUTE NRBC 0.00 0.00 - 0.01 K/uL   CREATININE    Collection Time: 08/31/21  7:50 PM   Result Value Ref Range    Creatinine 0.46 (L) 0.55 - 1.02 MG/DL    GFR est AA >60 >60 ml/min/1.73m2    GFR est non-AA >60 >60 ml/min/1.73m2   AST    Collection Time: 08/31/21  7:50 PM   Result Value Ref Range    AST (SGOT) 11 (L) 15 - 37 U/L   ALT    Collection Time: 08/31/21  7:50 PM   Result Value Ref Range    ALT (SGPT) 16 12 - 78 U/L   URIC ACID    Collection Time: 08/31/21  7:50 PM   Result Value Ref Range    Uric acid 3.7 2.6 - 6.0 MG/DL   LD    Collection Time: 08/31/21  7:50 PM   Result Value Ref Range     81 - 246 U/L   URINALYSIS W/ REFLEX CULTURE    Collection Time: 08/31/21  7:50 PM    Specimen: Urine   Result Value Ref Range    Color YELLOW/STRAW      Appearance CLEAR CLEAR      Specific gravity 1.010 1.003 - 1.030      pH (UA) 6.5 5.0 - 8.0      Protein Negative NEG mg/dL    Glucose Negative NEG mg/dL    Ketone Negative NEG mg/dL    Bilirubin Negative NEG      Blood TRACE (A) NEG      Urobilinogen 0.2 0.2 - 1.0 EU/dL    Nitrites Negative NEG      Leukocyte Esterase SMALL (A) NEG      WBC 10-20 0 - 4 /hpf    RBC 0-5 0 - 5 /hpf    Epithelial cells MODERATE (A) FEW /lpf    Bacteria 1+ (A) NEG /hpf    UA:UC IF INDICATED PENDING     Hyaline cast 0-2 0 - 5 /lpf   BILIRUBIN, TOTAL    Collection Time: 08/31/21  7:50 PM   Result Value Ref Range    Bilirubin, total 0.5 0.2 - 1.0 MG/DL   COVID-19 RAPID TEST    Collection Time: 08/31/21  8:20 PM   Result Value Ref Range    Specimen source Nasopharyngeal      COVID-19 rapid test Not detected NOTD         Patient Active Problem List   Diagnosis Code    Decreased fetal movements in third trimester O36.8130    Obesity complicating pregnancy in third trimester O99.213    Body mass index 39.0-39.9, adult Z68.39    37 weeks gestation of pregnancy Z3A.37    Uterine contractions during pregnancy O62.2     Assessment and Plan:   IUP at 38 weeks 3 days in prodromal labor. CAT II FHR    Plan: 1 Admit for augmentation of labor. 2 Will give IV fluids and hydration to resolve variables. 3 Monitor closely.         Signed By:  Alvena Kawasaki, MD     August 31, 2021

## 2021-09-01 NOTE — DISCHARGE SUMMARY
Patient IDJuana Sever  [id number]  31 y.o.  1995    Admit Date: 2021    Discharge Date:     Admitting Physician: Rochelle Sawyer MD    Attending Physician: Didi Lee MD    Admission Diagnoses: Uterine contractions during pregnancy [O62.2]  Pregnant [Z34.90]    Procedures for this admission:     Discharge Diagnoses: Same as above with  producing a viable infant. Information for the patient's :  Amandeep Bonner [[id number]]            Discharge Disposition:  good    Discharge Condition:  good    Additional Diagnoses: IUP 38wks early labor, HTN, BMI >40. Maternal Labs:   Lab Results   Component Value Date/Time    HBsAg, External negative  2021 12:00 AM    HIV, External negative  2021 12:00 AM    Rubella, External immune  2021 12:00 AM    RPR, External non reactive  2021 12:00 AM    GrBStrep, External negative  2021 12:00 AM       Cord Blood Results:   Information for the patient's :   [address], Female Ositojayda Marcano [[id number]]     Lab Results   Component Value Date/Time    SHANE IgG NEG 2021 05:31 AM    Bilirubin if SHANE pos: IF DIRECT JANET POSITIVE, BILIRUBIN TO FOLLOW 2021 05:31 AM    ABO/Rh(D) B POSITIVE 2021 05:31 AM            History of Present Illness:   OB History        2    Para   1    Term   1            AB        Living   1       SAB        TAB        Ectopic        Molar        Multiple        Live Births   1              Admitted for labor augmentation. Hospital Course:   Patient was admitted as above and delivered via  . Please the chart for details. The postpartum course was unremarkable. She was deemed stable for discharge home on day 1.     Follow-up Care: 1mo        Signed:  Valeria Beltran MD  2021  8:16 AM

## 2021-09-01 NOTE — L&D DELIVERY NOTE
Delivery Summary    Patient: Dominick Jenkins MRN: [id number]  SSN: [id number]    YOB: 1995  Age: 31 y.o. Sex: female       Information for the patient's :  [place], Female Ann Tan [[id number]]       Labor Events:    Labor: No    Steroids: None   Cervical Ripening Date/Time:       Cervical Ripening Type: None   Antibiotics During Labor: No   Rupture Identifier:      Rupture Date/Time: 2021     Rupture Type: AROM   Amniotic Fluid Volume: Moderate    Amniotic Fluid Description: Clear    Amniotic Fluid Odor: None    Induction: AROM; Oxytocin       Induction Date/Time: 2021 10:18 PM    Indications for Induction: Gestational Hypertension    Augmentation: None   Augmentation Date/Time:      Indications for Augmentation:     Labor complications: Fetal Intolerance; Shoulder Dystocia       Additional complications:        Delivery Events:  Indications For Episiotomy:     Episiotomy: None   Perineal Laceration(s): 1st   Repaired: Yes   Periurethral Laceration Location: right    Repaired: Yes   Labial Laceration Location:     Repaired:     Sulcal Laceration Location:     Repaired:     Vaginal Laceration Location:     Repaired:     Cervical Laceration Location:     Repaired:     Repair Suture: Chromic 2-0;Chromic 3-0   Number of Repair Packets: 2   Estimated Blood Loss (ml):  ml   Quantitative Blood Loss (ml)                Delivery Date: 2021    Delivery Time: 4:02 AM  Delivery Type: Vaginal, Spontaneous  Sex:  Female    Gestational Age: 38w4d   Delivery Clinician:  Michael Underwood  Living Status: Living   Delivery Location: L&D            APGARS  One minute Five minutes Ten minutes   Skin color:            Heart rate:            Grimace:            Muscle tone:            Breathing: Totals:                Presentation: Vertex    Position:        Resuscitation Method:  Suctioning-bulb; Tactile Stimulation     Meconium Stained: Terminal      Cord Information: Complications: None  Cord around:    Delayed cord clamping? Yes  Cord clamped date/time:2021  4:03 AM  Disposition of Cord Blood: Lab    Blood Gases Sent?: No    Placenta:  Date/Time:    Removal:        Appearance:        Measurements:  Birth Weight:        Birth Length:        Head Circumference:        Chest Circumference:       Abdominal Girth: Other Providers:   LYLA PERES, EVETTE GUY;KYLE PHAN;MOOKIE HENRY;MALCOLM SOLORZANO Files, Obstetrician;Primary Nurse;Primary [place] Nurse;Nursery Nurse;Charge Nurse     The patient delivered via vaginal delivery a viable female infant with APGARS 8/9 in [place]. Right shoulder dystocia encountered and resolved with the Rina maneuver. Placenta was removed inact with a 3 vessel cord. A second degree laceration and a sub-urethral laceration encountered. They were repaired with 2-0 and 3-0 chromic suture. EBL was about 300cc. Pt tolerated the delivery in stable condition. Group B Strep: NEG  Lab Results   Component Value Date/Time    GrHELENtrep, External negative  2021 12:00 AM     Information for the patient's :   [facility], Female Cheryll Gosselin [[id number]]   No results found for: ABORH, PCTABR, PCTDIG, BILI, ABORHEXT, ABORH     No results for input(s): PCO2CB, PO2CB, HCO3I, SO2I, IBD, PTEMPI, SPECTI, PHICB, ISITE, IDEV, IALLEN in the last 72 hours.

## 2021-09-01 NOTE — LACTATION NOTE
This note was copied from a baby's chart. Experienced breastfeeding mother. She breast fed her first baby for 9 months. LC reviewed timing of feedings, proper latch, positions for breastfeeding, skin to skin and hand expression. Discussed with mother her plan for feeding. Reviewed the benefits of exclusive breast milk feeding during the hospital stay. Informed her of the risks of using formula to supplement in the first few days of life as well as the benefits of successful breast milk feeding; referred her to the Breastfeeding booklet about this information. She acknowledges understanding of information reviewed and states that it is her plan to breastfeed,bottle feed her infant. Will support her choice and offer additional information as needed. Encouraged mom to attempt feeding with baby led feeding cues. Just as sucking on fingers, rooting, mouthing. Looking for 8-12 feedings in 24 hours. Don't limit baby at breast, allow baby to come of breast on it's own. Baby may want to feed  often and may increase number of feedings on second day of life. Skin to skin encouraged. If baby doesn't nurse,  Mom should  hand express  10-20 drops of colostrum and drip into baby's mouth, or give to baby by finger feeding, cup feeding, or spoon feeding at least every 2-3 hours. Mother will successfully establish breastfeeding by feeding in response to early feeding cues   or wake every 3h, will obtain deep latch, and will keep log of feedings/output. Taught to BF at hunger cues and or q 2-3 hrs and to offer 10-20 drops of hand expressed colostrum at any non-feeds.       Breast Assessment  Left Breast: Medium, Large  Left Nipple: Everted, Intact, Short  Right Breast: Medium, Large  Right Nipple: Everted, Intact, Short  Breast- Feeding Assessment  Attends Breast-Feeding Classes: No  Breast-Feeding Experience: Yes (Breast fed 1st baby x 9 months)  Breast Trauma/Surgery: No  Type/Quality: Good (Per mother)  Lactation Consultant Visits  Breast-Feedings:  (mother states she last breast fed baby at 1.)  Mother/Infant Observation  Infant Observation: Frenulum checked      Mother given breastfeeding handouts and LC#.

## 2021-09-01 NOTE — PROGRESS NOTES
9/1/2021  9:53 AM    CM met with DAVID to complete initial assessment and begin discharge planning. MOB verified and confirmed demographics. DAVID lives with spouse/[facility] ( [phone]) at the address on file. DAVID is employed and plans to take 6wks off from work. FOB is also employed and will be taking adequate time off. DAVID reports she has good family support. DAVID plans to breast and bottle feed baby and has pump to use at home. DAVID does not have a pediatrician selected, CM provided DAVID with list. DAVID has car seat, bassinet/crib, clothing, bottles and all necessary supplies for baby. DAVID has [employer], and will be adding baby to this policy. CM discussed process to add baby to insurance, MOB verbalized understanding. DAVID denied needing WIC/Medicaid services at this time. Care Management Interventions  PCP Verified by CM: Yes (Thuan)  Mode of Transport at Discharge:  Other (see comment)  Transition of Care Consult (CM Consult): Discharge Planning  Current Support Network: Own Home, Family Lives Nearby, Lives with Spouse  Confirm Follow Up Transport: Family  Discharge Location  Discharge Placement: Home with family assistance  Betzy Emmanuel

## 2021-09-01 NOTE — ANESTHESIA PROCEDURE NOTES
Epidural Block    Patient location during procedure: OB  Start time: 8/31/2021 11:17 PM  End time: 8/31/2021 11:25 PM  Reason for block: labor epidural  Staffing  Performed: attending   Anesthesiologist: Bola Edward MD  Preanesthetic Checklist  Completed: patient identified, IV checked, site marked, risks and benefits discussed, surgical consent, monitors and equipment checked, pre-op evaluation and timeout performed  Block Placement  Patient position: sitting  Prep: ChloraPrep  Sterility prep: cap, drape, gloves, hand and mask  Sedation level: no sedation  Patient monitoring: heart rate and continuous pulse oximetry  Approach: midline  Location: lumbar  Lumbar location: L2-L3  Epidural  Loss of resistance technique: saline  Guidance: landmark technique  Needle  Needle type: Tuohy   Needle gauge: 17 G  Needle length: 9 cm  Catheter type: multi-orifice  Catheter size: 20 G  Catheter securement method: clear occlusive dressing and surgical tape  Test dose: negative  Assessment  Block outcome: pain improved  Number of attempts: 1  Procedure assessment: patient tolerated procedure well with no immediate complications

## 2021-09-01 NOTE — PROGRESS NOTES
The pt has now progressed to 70/5, -2 station. FHR is reactive only an occasional variable. AROM revealed clear fluid. Will continue trial of labor.

## 2021-09-01 NOTE — PROGRESS NOTES
Bedside and Verbal shift change report given to ED Reynolds RN (oncoming nurse) by AYDE Diaz RN (offgoing nurse). Report included the following information SBAR, Kardex, Procedure Summary, Intake/Output, MAR, Accordion, Recent Results and Med Rec Status. 1000 into room at this time, patient complains of IV pain, assessed and noted swelling in the arm, discontinued IV due to infiltration at this time. Fundus remains firm at umbilicus, will reassess bleeding before transferring patient.      [address] handoff given to Nick Sandy RN

## 2021-09-01 NOTE — PROGRESS NOTES
8515: Bedside and Verbal shift change report given to AYDE Nicholas (oncoming nurse) by LICHA Cannon RN (offgoing nurse). Report included the following information SBAR, Kardex, Procedure Summary, Intake/Output, MAR, Accordion, Recent Results and Med Rec Status.

## 2021-09-02 PROCEDURE — 74011250637 HC RX REV CODE- 250/637: Performed by: OBSTETRICS & GYNECOLOGY

## 2021-09-02 PROCEDURE — 65270000029 HC RM PRIVATE

## 2021-09-02 PROCEDURE — 2709999900 HC NON-CHARGEABLE SUPPLY

## 2021-09-02 PROCEDURE — 74011250637 HC RX REV CODE- 250/637: Performed by: NURSE PRACTITIONER

## 2021-09-02 RX ORDER — ACETAMINOPHEN 325 MG/1
650 TABLET ORAL
Qty: 40 TABLET | Refills: 0 | Status: SHIPPED
Start: 2021-09-02

## 2021-09-02 RX ADMIN — ACETAMINOPHEN 650 MG: 325 TABLET ORAL at 20:03

## 2021-09-02 RX ADMIN — OXYCODONE 5 MG: 5 TABLET ORAL at 22:06

## 2021-09-02 RX ADMIN — ACETAMINOPHEN 650 MG: 325 TABLET ORAL at 04:06

## 2021-09-02 RX ADMIN — ACETAMINOPHEN 650 MG: 325 TABLET ORAL at 16:04

## 2021-09-02 RX ADMIN — OXYCODONE 5 MG: 5 TABLET ORAL at 04:06

## 2021-09-02 RX ADMIN — ACETAMINOPHEN 650 MG: 325 TABLET ORAL at 09:07

## 2021-09-02 RX ADMIN — OXYCODONE 5 MG: 5 TABLET ORAL at 16:04

## 2021-09-02 RX ADMIN — ACETAMINOPHEN 650 MG: 325 TABLET ORAL at 00:20

## 2021-09-02 NOTE — LACTATION NOTE
This note was copied from a baby's chart. LC in to re-visit with mother. Baby was under photo therapy. Mother states she pumped twice but did not get anything - mother reassured that this is a normal finding at this time. She formula fed baby at 1330 and baby took 35 ml. LC able to help mother hand express - 5 drops of colostrum expressed and finger fed to baby. Instructed mother to call [facility] breastfeeding assistance.

## 2021-09-02 NOTE — PROGRESS NOTES
Post-Partum Day Number 1 Progress Note    [employer] [facility]       Information for the patient's :  Kieran Box [[id number]]   Vaginal, Spontaneous    Patient doing well without significant complaint. Voiding without difficulty, normal lochia. Tolerating diet without nausea or vomiting. Pain controlled with oral medications. Vitals:  Visit Vitals  /70 (BP 1 Location: Right arm, BP Patient Position: At rest)   Pulse 72   Temp 98 °F (36.7 °C)   Resp 16   Ht 5' 7\" (1.702 m)   Wt 116.1 kg (256 lb)   SpO2 99%   Breastfeeding Unknown   BMI 40.10 kg/m²     Temp (24hrs), Av.5 °F (36.9 °C), Min:98 °F (36.7 °C), Max:99.2 °F (37.3 °C)        Exam:   Patient without distress. FF @ U-2 NT                LE NT w/o edema    Labs:     Lab Results   Component Value Date/Time    WBC 11.1 (H) 2021 07:50 PM    HGB 11.4 (L) 2021 07:50 PM    HCT 35.6 2021 07:50 PM    PLATELET 299  07:50 PM     Lab Results   Component Value Date/Time    Rubella, External immune  2021 12:00 AM    GrBStrep, External negative  2021 12:00 AM    HBsAg, External negative  2021 12:00 AM    HIV, External negative  2021 12:00 AM    RPR, External non reactive  2021 12:00 AM    Gonorrhea, External negative  2021 12:00 AM    Chlamydia, External negative  2021 12:00 AM           Assessment:   PPD 1 s/p , Doing well and stable  Plan:  1. Continue routine postpartum care  2. Discharge home today      Neo Castro DO  2021  8:18 AM    ADDENDUM 1350    Baby with hyperbilirubin requiring bili-lights, so will withdraw discharge order for mom.      Neo Castro DO

## 2021-09-02 NOTE — LACTATION NOTE
This note was copied from a baby's chart. Mother states baby had been fussy on breast so she started formula feeding her baby. Mother states baby last fed at 11:30 (20 ml) of formula. Baby has an elevated bilirubin level and is starting phototherapy. LC stressed the importance of frequent feedings (Q2-3 hr) and to hand express 10-20 drops of colostrum for her baby. Symphony pump set up at bedside (instructions for use given) - instructed mother to pump TID or if baby does not breast feed to help stimulate her breast milk supply. Reviewed breastfeeding basics:  Supply and demand, breastfeed baby 8-12 times in 24 hr., feed baby on demand,   stomach size, early  Feeding cues, skin to skin, positioning and baby led latch-on, assymetrical latch with signs of good, deep latch vs shallow, feeding frequency and duration, and log sheet for tracking infant feedings and output. Breastfeeding Booklet and Warm line information given. Discussed typical  weight loss and the importance of infant weight checks with pediatrician 1-2 post discharge. Phototherapy Care:  Phototherapy and high bilirubin levels may disrupt breastfeeding if baby is very sleepy,  from mother, feeding cues missed, and potential clinical intervention of supplementation if ordered by physician. Keep baby in the room during phototherapy as able. Phototherapy blanket allows for infant to be held and nursed while still receiving treatment. Close contact with baby reduces the chance of missing feeding cues. Frequent (every 2 hours) efficient feedings help lower jaundice levels by the passage of bilirubin in baby's stool. Parent(s) will be taught to provide infant with hand expressed colostrum (minimum of 10-20 drops) at any non-feedings and that pumping may be introduced if further intervention is necessary and/or if physician orders supplementation.       Mother  will successfully establish breastfeeding by feeding in response to early feeding cues   or wake every 3h, will obtain deep latch, and will keep log of feedings/output. Taught to BF at hunger cues and or q 2-3 hrs and to offer 10-20 drops of hand expressed colostrum at any non-feeds. Breast Assessment  Left Breast: Medium, Large  Left Nipple: Everted, Intact, Short  Right Breast: Medium, Large  Right Nipple: Everted, Intact, Short  Breast- Feeding Assessment  Attends Breast-Feeding Classes: No  Breast-Feeding Experience: Yes  Breast Trauma/Surgery: No  Type/Quality: Good  Lactation Consultant Visits  Breast-Feedings:  (Mother states baby was fussing and did not seem satisfied after breastfeeding so she formula fed baby at 1130 (baby took 20 ml). Reviewed hand expressing drops for baby. Instructed mom to pump TID/or if baby does not nurse to stimulate her supply)  Mother/Infant Observation  Infant Observation: Frenulum checked     Instructed mother to call [facility] when baby is ready to breastfeed again.

## 2021-09-02 NOTE — PROGRESS NOTES
Bedside and Verbal shift change report given to 2600 Mihai Colon (oncoming nurse) by Nitza PARKER (offgoing nurse). Report included the following information SBAR, Kardex, Intake/Output, MAR and Recent Results.

## 2021-09-02 NOTE — ROUTINE PROCESS
Bedside and Verbal shift change report given to BILLIE Paredes RN (oncoming nurse) by [employer]. Marissa Paniagua RN (offgoing nurse). Report included the following information SBAR, Kardex, Intake/Output, MAR, Accordion and Recent Results.

## 2021-09-02 NOTE — DISCHARGE INSTRUCTIONS
Discharge Instructions for Vaginal Delivery    Patient ID:  Tunde Salas  [id number]  31 y.o.  1995    Take Home Medications       Continue taking your prenatal vitamins if you are breastfeeding. Follow-up care is a key part of your treatment and safety. Please schedule and keep appointments. Follow-up with your primary OB in 6 weeks. Activity  Avoid anything in your vagina for 6 weeks (no intercourse, tampons, or douching). You may drive unless you are taking prescription pain medications. Climbing stairs and light lifting are okay. Please avoid excessive exercise, though walking is okay- you'll be tired! Diet  Regular diet as tolerated. Be sure to drink plenty of fluids if you are breastfeeding. Wound care  If you have stitches, continue to rinse with a squirt bottle of warm water each time you void for about 7-10 days. .  Your stitches will gradually dissolve over four to eight weeks. Sitz baths are also helpful to keep the wound clean, encourage healing, and to help with pain associated with the stitches or hemorrhoids. You can use either a sitz bath basin or a bathtub filled with 2-3\" inches of plain warm water. Soak for 10 minutes 3 times a day as tolerated. Pain Management  1. Over the counter medications such as Tylenol and ibuprofen (Motrin or Advil) are ideal.  These may be taken together, alternating doses. You may  take the maximum dose:  Motrin or Advil (generic ibuprofen), either 3 tablets every 6 hours or 4 tablets every 8 hours or Tylenol (acetominophen) 1000mg every 6 hours (equivalent to 2 extra strength Tylenol). 2. You may also have a precrescription for stronger pain medication. Take only as needed and transition to over the counter medication in the next few days. Minimize amounts of the prescription medication, as it can be habit-forming and will worsen or cause constipation.  Most patients will find that within a couple of days, their pain is adequately controlled using only over-the-counter medications. 3. The prescription pain medication is mixed with Tylenol, therefore, you should not take any extra Tylenol or acetaminophen until you have reduced your prescription pain medication. 4. Add heating pad or sitz baths as needed. Add hemorrhoid wipes or ointments if needed    Constipation  1. Constipation is normal after pregnancy and delivery, especially while taking prescription narcotic pain medication. 2. Over the counter remedies including ducosate (Colace), take 1-2 capsules 1-2 times daily for soft stool as needed. You may also add/ try milk of magnesia or rectal remedies such as Dulcolax or Fleets enema. Recovery: What to Expect at Home  1. Fatigue is expected. Try to rest when you can and don't worry about doing housework or other tasks which can wait. 2. The soreness along your bottom will improve significantly over the first 2 weeks, but it may take 6 weeks before you are completely recovered. 3. Back pain or general body aches or muscle soreness are expected and should improve with acetominophen or ibuprofen. 4. Leg swelling due to pregnancy and/or IV fluids given in the hospital will take about two weeks to resolve. 5. Most women experience some form of the \"Baby Blues\" after having a baby. Feeling emotional, tearful, frustrated, anxious, sad, and irritable some of the time is normal and go away after about 2 weeks. Adequate rest and help from your family will help. Take breaks from caring for the baby. Call your doctor if your symptoms seem severe, last more than 2 weeks, or seem to be getting worse instead of better. Get help immediately if you have thoughts of wanting to hurt yourself or others! Call your doctor or seek immediate medical care if you have:  Heavy vaginal bleeding, soaking through one or more pads an hour for several hours. Foul-smelling discharge from your vagina or incision.   Consistent nausea and vomiting and cannot keep fluids down. Consistent pain that does not get better after you take pain medicine. Sudden chest pain and shortness of breath  Signs of a blood clot: pain/ swelling/ increasing redness in your lower extremeties  Signs of infection: increased pain in your abdomen or vaginal area; red streaks, warmth, or tenderness of your breasts; fever of 100.5 F or greater    POSTPARTUM DISCHARGE INSTRUCTIONS       Name:  Ledell Felty  YOB: 1995  Admission Diagnosis:  Uterine contractions during pregnancy [O62.2]  Pregnant [Z34.90]     Discharge Diagnosis:    Problem List as of 9/2/2021 Date Reviewed: 8/21/2021        Codes Class Noted - Resolved    Uterine contractions during pregnancy ICD-10-CM: O62.2  ICD-9-CM: 661.20  8/31/2021 - Present        Pregnant ICD-10-CM: Z34.90  ICD-9-CM: V22.2  8/31/2021 - Present        Decreased fetal movements in third trimester ICD-10-CM: O36.8130  ICD-9-CM: 655.73  8/21/2021 - Present    Overview Signed 8/21/2021  3:51 PM by Aubree Salazar MD     NST is reactive  AFV is normal  Fetal growth is good  BPP 10/10  I reassured the patient. Will discharge home  She will follow up as scheduled. She will return if fetal movements do not return to normal.             Obesity complicating pregnancy in third trimester ICD-10-CM: O99.213  ICD-9-CM: 649.13  8/21/2021 - Present        Body mass index 39.0-39.9, adult ICD-10-CM: Z68.39  ICD-9-CM: V85.39  8/21/2021 - Present        37 weeks gestation of pregnancy ICD-10-CM: Z3A.37  ICD-9-CM: V22.2  8/21/2021 - Present            Attending Physician:  Cherri De La Fuente MD    Delivery Type:  Vaginal Childbirth with Episiotomy, Laceration or Tear: What To Expect At [address] [place] [address] will slowly heal in the next few weeks. It is easy to get too tired and overwhelmed during the first weeks after your baby is born. Changes in your hormones can shift your mood without warning.  You may find it hard to meet the extra demands on your energy and time. Take it easy on yourself. Follow-up care is a key part of your treatment and safety. Be sure to make and go to all appointments, and call your doctor if you are having problems. It's also a good idea to know your test results and keep a list of the medicines you take. How can you care for yourself at home? Vaginal Bleeding and Cramps  · After delivery, you will have a bloody discharge from the vagina. This will turn pink within a week and then white or yellow after about 10 days. It may last for 2 to 4 weeks or longer, until the uterus has healed. Use pads instead of tampons until you stop bleeding. · Do not worry if you pass some blood clots, as long as they are smaller than a golf ball. If you have a tear or stitches in your vaginal area, change the pad at least every 4 hours to prevent soreness and infection. · You may have cramps for the first few days after childbirth. These are normal and occur as the uterus shrinks to normal size. Take an over-the-counter pain medicine, such as acetaminophen (Tylenol), ibuprofen (Advil, Motrin), or naproxen (Aleve), for cramps. Read and follow all instructions on the label. Do not take aspirin, because it can cause more bleeding. Do not take acetaminophen (Tylenol) and other acetaminophen containing medications (i.e. Percocet) at the same time. Episiotomy, Lacerations or Tears  · If you have stitches, they will dissolve on their own and do not need to be removed. · Put ice or a cold pack on your painful area for 10 to 20 minutes at a time, several times a day, for the first few days. Put a thin cloth between the ice and your skin. · Sit in a few inches of warm water (sitz bath) 3 times a day and after bowel movements. The warm water helps with pain and itching. If you do not have a tub, a warm shower might help. Breast fullness  · Your breasts may overfill (engorge) in the first few days after delivery.  To help milk flow and to relieve pain, warm your breasts in the shower or by using warm, moist towels before nursing. · If you are not nursing, do not put warmth on your breasts or touch your breasts. Wear a tight bra or sports bra and use ice until the fullness goes away. This usually takes 2 to 3 days. · Put ice or a cold pack on your breast after nursing to reduce swelling and pain. Put a thin cloth between the ice and your skin. Activity  · Eat a balanced diet. Do not try to lose weight by cutting calories. Keep taking your prenatal vitamins, or take a multivitamin. · Get as much rest as you can. Try to take naps when your baby sleeps during the day. · Get some exercise every day. But do not do any heavy exercise until your doctor says it is okay. · Wait until you are healed (about 4 to 6 weeks) before you have sexual intercourse. Your doctor will tell you when it is okay to have sex. · Talk to your doctor about birth control. You can get pregnant even before your period returns. Also, you can get pregnant while you are breast-feeding. Mental Health  · Many women get the \"baby blues\" during the first few days after childbirth. You may lose sleep, feel irritable, and cry easily. You may feel happy one minute and sad the next. Hormone changes are one cause of these emotional changes. Also, the demands of a new baby, along with visits from relatives or other family needs, add to a mother's stress. The \"baby blues\" often peak around the fourth day. Then they ease up in less than 2 weeks. · If your moodiness or anxiety lasts for more than 2 weeks, or if you feel like life is not worth living, you may have postpartum depression. This is different for each mother. Some mothers with serious depression may worry intensely about their infant's well-being. Others may feel distant from their child. Some mothers might even feel that they might harm their baby.  A mother may have signs of paranoia, wondering if someone is watching her. · With all the changes in your life, you may not know if you are depressed. Pregnancy sometimes causes changes in how you feel that are similar to the symptoms of depression. · Symptoms of depression include:  · Feeling sad or hopeless and losing interest in daily activities. These are the most common symptoms of depression. · Sleeping too much or not enough. · Feeling tired. You may feel as if you have no energy. · Eating too much or too little. · [employer] ([employer]I) offers a Warm line; Chat with the Expert phone sessions; Information and Articles about Pregnancy and Postpartum Mood Disorders; Comprehensive List of Free Support Groups; Knowledgeable local coordinators who will offer support, information, and resources; Guide to Resources on [employer]; Calendar of events in the  mood disorders community; Latest News and Research; and [facility] [address] for [employer]. Remember - You are not alone; You are not to blame; With help, you will be well. 2-656-594-PPD(3559). WWW. POSTPARTUM. NET    · Writing or talking about death, such as writing suicide notes or talking about guns, knives, or pills. Keep the numbers for these national suicide hotlines: 7-817-908-TALK (3-750.305.8250) and 6-066-WGNJEUQ (4-481.203.6634). If you or someone you know talks about suicide or feeling hopeless, get help right away. Constipation and Hemorrhoids  Drink plenty of fluids, enough so that your urine is light yellow or clear like water. If you have kidney, heart, or liver disease and have to limit fluids, talk with your doctor before you increase the amount of fluids you drink. · Eat plenty of fiber each day. Have a bran muffin or bran cereal for breakfast, and try eating a piece of fruit for a mid-afternoon snack. · For painful, itchy hemorrhoids, put ice or a cold pack on the area several times a day for 10 minutes at a time.  Follow this by putting a warm compress on the area for another 10 to 20 minutes or by sitting in a shallow, warm bath. When should you call for help? Call 911 anytime you think you may need emergency care. For example, call if:  · You are thinking of hurting yourself, your baby, or anyone else. · You passed out (lost consciousness). · You have symptoms of a blood clot in your lung (called a pulmonary embolism). These may include:  · Sudden chest pain. · Trouble breathing. · Coughing up blood. Call your doctor now or seek immediate medical care if:  · You have severe vaginal bleeding. · You are soaking through a pad each hour for 2 or more hours. · Your vaginal bleeding seems to be getting heavier or is still bright red 4 days after delivery. · You are dizzy or lightheaded, or you feel like you may faint. · You are vomiting or cannot keep fluids down. · You have a fever. · You have new or more belly pain. · You pass tissue (not just blood). · Your vaginal discharge smells bad. · Your belly feels tender or full and hard. · Your breasts are continuously painful or red. · You feel sad, anxious, or hopeless for more than a few days. · You have sudden, severe pain in your belly. · You have symptoms of a blood clot in your leg (called a deep vein thrombosis), such as:  · Pain in your calf, back of the knee, thigh, or groin. · Redness and swelling in your leg or groin. · You have symptoms of preeclampsia, such as:  · Sudden swelling of your face, hands, or feet. · New vision problems (such as dimness or blurring). · A severe headache. · Your blood pressure is higher than it should be or rises suddenly. · You have new nausea or vomiting. Watch closely for changes in your health, and be sure to contact your doctor if you have any problems. Additional Information:  Learning About Hypertensive Disorders After Childbirth    What is preeclampsia?      A woman with preeclampsia has blood pressure that is higher than usual. She may also have other serious symptoms. Preeclampsia can be dangerous. When it is severe, it can cause seizures (eclampsia) or liver or kidney damage. When the liver is affected, some women get HELLP syndrome, a blood-clotting and bleeding problem. HELLP can come on quickly and can be deadly. This is why your doctor checks you and your baby often. Preeclampsia usually occurs after 20 weeks of pregnancy. In rare cases, it is first noted right after childbirth. Most often, it starts near the end of pregnancy and goes away after childbirth. What are the symptoms? Mild preeclampsia usually doesn't cause symptoms. But preeclampsia can cause rapid weight gain and sudden swelling of the hands and face. Severe preeclampsia does cause symptoms. It can cause a very bad headache and trouble seeing and breathing. It also can cause belly pain. You may also urinate less than usual.    If you have new preeclampsia symptoms after you go home from the hospital, call your doctor right away. What can you expect after you have had preeclampsia? In the hospital  After the baby and the placenta are delivered, preeclampsia usually starts to improve. Most women get better in the first few days after childbirth. After having preeclampsia, you still have a risk of seizures for a day or more after childbirth. (Very rarely, seizures happen later on.) So your doctor may have you take magnesium sulfate for a day or more to prevent seizures. You may also take medicine to lower your blood pressure. When you go home  Your blood pressure will most likely return to normal a few days after delivery. Your doctor will want to check your blood pressure sometime in the first week after you leave the hospital.    Some women still have high blood pressure 6 weeks after childbirth. But most return to normal levels over the long term. · Take and record your blood pressure at home if your doctor tells you to.   · Learn the importance of the two measures of blood pressure (such as 120 over 80, or 120/80). The first number is the systolic pressure. This is the force of blood on the artery walls as the heart pumps. The second number is the diastolic pressure. This is the force of blood on the artery walls between heartbeats, when the heart is at rest. You have a choice of monitors to use. Manual monitor: You pump up the cuff and use a stethoscope to listen for your  Pulse. · Electronic monitor: The cuff inflates, and a gauge shows your pulse rate. · To take your blood pressure:  · Ask your doctor to check your blood pressure monitor to be sure that it is accurate and that the cuff fits you. Also ask your doctor to watch you use it, to make sure that you are using it right. · You should not eat, use tobacco products, or use medicine known to raise blood pressure (such as some nasal decongestant sprays) before you take your blood pressure. · Avoid taking your blood pressure if you have just exercised or are nervous or upset. Rest at least 15 minutes before you take your blood pressure. · Be safe with medicines. If you take medicine, take it exactly as prescribed. Call your doctor if you think you are having a problem with your medicine. · Do not smoke. Quitting smoking will help lower your blood pressure and improve your baby's growth and health. If you need help quitting, talk to your doctor about stop-smoking programs and medicines. These can increase your chances of quitting for good. · Eat a balanced and healthy diet that has lots of fruits and vegetables. Long-term health   After you have had preeclampsia, you have a higher-than-average risk of heart disease, stroke, and kidney disease. This may be because the same things that cause preeclampsia also cause heart and kidney disease. To protect your health, work with your doctor on living a heart-healthy lifestyle and getting the checkups you need.  Your doctor may also want you to check your blood pressure at home. Follow-up care is a key part of your treatment and safety. Be sure to make and go to all appointments, and call your doctor if you are having problems. It's also a good idea to know your test results and keep a list of the medicines you take. These are general instructions for a healthy lifestyle:    No smoking/ No tobacco products/ Avoid exposure to second hand smoke    Surgeon General's Warning:  Quitting smoking now greatly reduces serious risk to your health. Obesity, smoking, and sedentary lifestyle greatly increases your risk for illness    A healthy diet, regular physical exercise & weight monitoring are important for maintaining a healthy lifestyle    Recognize signs and symptoms of STROKE:    F-face looks uneven    A-arms unable to move or move unevenly    S-speech slurred or non-existent    T-time-call 911 as soon as signs and symptoms begin - DO NOT go       back to bed or wait to see if you get better - TIME IS BRAIN. I have had the opportunity to make my options or choices for discharge. I have received and understand these instructions.

## 2021-09-03 VITALS
HEIGHT: 67 IN | DIASTOLIC BLOOD PRESSURE: 77 MMHG | RESPIRATION RATE: 16 BRPM | OXYGEN SATURATION: 97 % | BODY MASS INDEX: 40.18 KG/M2 | TEMPERATURE: 98.3 F | HEART RATE: 97 BPM | WEIGHT: 256 LBS | SYSTOLIC BLOOD PRESSURE: 135 MMHG

## 2021-09-03 PROCEDURE — 74011250637 HC RX REV CODE- 250/637: Performed by: OBSTETRICS & GYNECOLOGY

## 2021-09-03 PROCEDURE — 74011250637 HC RX REV CODE- 250/637: Performed by: NURSE PRACTITIONER

## 2021-09-03 RX ORDER — OXYCODONE HYDROCHLORIDE 5 MG/1
5 TABLET ORAL
Qty: 10 TABLET | Refills: 0 | Status: SHIPPED | OUTPATIENT
Start: 2021-09-03 | End: 2021-09-03

## 2021-09-03 RX ORDER — OXYCODONE HYDROCHLORIDE 5 MG/1
5 TABLET ORAL
Qty: 10 TABLET | Refills: 0 | Status: SHIPPED | OUTPATIENT
Start: 2021-09-03 | End: 2021-09-06

## 2021-09-03 RX ADMIN — ACETAMINOPHEN 650 MG: 325 TABLET ORAL at 00:03

## 2021-09-03 RX ADMIN — OXYCODONE 5 MG: 5 TABLET ORAL at 04:03

## 2021-09-03 RX ADMIN — DIPHENHYDRAMINE HYDROCHLORIDE 25 MG: 25 CAPSULE ORAL at 07:41

## 2021-09-03 RX ADMIN — ACETAMINOPHEN 650 MG: 325 TABLET ORAL at 04:03

## 2021-09-03 NOTE — PROGRESS NOTES
Post-Partum Day Number 2 Progress Note    Patient doing well post-partum without significant complaints. Voiding without difficulty, normal lochia. Vitals:  Patient Vitals for the past 24 hrs:   BP Temp Pulse Resp SpO2   21 0810 111/68 98.1 °F (36.7 °C) 63 16 98 %   21 0341 133/82 98.2 °F (36.8 °C) 65 16 97 %   21 2321 130/80 98.4 °F (36.9 °C) 69 16 98 %   21 1938 126/67 98.5 °F (36.9 °C) 76 16 96 %   21 1542 128/71 98.2 °F (36.8 °C) 68 16 98 %   21 1138 116/68 98.4 °F (36.9 °C) 80 16 98 %     Temp (24hrs), Av.3 °F (36.8 °C), Min:98.1 °F (36.7 °C), Max:98.5 °F (36.9 °C)      Vital signs stable, afebrile. Exam:  Patient without distress. Abdomen soft, fundus firm, nontender               Lower extremities, CHELSEA nontender w/o edema    Labs: No results found for this or any previous visit (from the past 24 hour(s)). Assessment and Plan:  PPD 2 s/p   1. VFI / Rh pos / Rubella immune   2. Routine postpartum/postop care. 3. Discharge today-instructions reviewed, follow up in office in 4-6 weeks.     Joby Thompson DO, [address] [facility],[address] [facility]

## 2021-09-03 NOTE — ROUTINE PROCESS
0700-Bedside and Verbal shift change report given to FRANK Bowles RN (oncoming nurse) by Rebbeca Nageotte RN (offgoing nurse). Report included the following information SBAR, Kardex, Intake/Output and MAR.

## 2021-09-03 NOTE — ROUTINE PROCESS
Discharge instructions given to patient including but not limited to signs and symptoms ad who to call; restrictions and limitations; postpartum depression. All questions answered. Discharge supplies given to patient. Signature pad not working in room. Hard signed copy placed in chart. Prescriptions sent to patient's pharmacy.

## 2021-09-03 NOTE — LACTATION NOTE
This note was copied from a baby's chart. Mother pumping and formula feeding. Baby under phototherapy. Discussed feeding plan moving forward. Talked with mother on how to encourage baby to latch to feed. Chart shows numerous feedings, void, stool WDL. Importance of monitoring outputs and feedings on first week Breastfeeding log and follow up with pediatrician visit for weight check in 1-2 days reviewed. Encouraged to call warm line number for any questions/problems that arise. Engorgement Care Guidelines:  Reviewed how milk is made and normal phases of milk production. Taught care of engorged breasts - frequent breastfeeding encouraged, cool packs and motrin as tolerated. Anticipatory guidance shared. Pt will successfully establish breastfeeding by feeding in response to early feeding cues or wake every 3h, will obtain deep latch, and will keep log of feedings/output. Taught to BF at hunger cues and or q 2-3 hrs and to offer 10-20 drops of hand expressed colostrum at any non-feeds.       Breast Assessment  Left Breast: Medium, Large  Left Nipple: Everted, Intact  Right Breast: Medium, Large  Right Nipple: Everted, Intact  Breast- Feeding Assessment  Attends Breast-Feeding Classes: No  Breast-Feeding Experience: Yes  Breast Trauma/Surgery: No  Type/Quality: Good  Lactation Consultant Visits  Breast-Feedings: Not breast-feeding  Mother/Infant Observation  Mother Observation: Breast comfortable  Infant Observation: Frenulum checked  LATCH Documentation  Latch:  (did not see baby at breast today)

## 2022-03-18 PROBLEM — Z34.90 PREGNANT: Status: ACTIVE | Noted: 2021-08-31

## 2022-03-19 PROBLEM — Z3A.37 37 WEEKS GESTATION OF PREGNANCY: Status: ACTIVE | Noted: 2021-08-21

## 2022-03-19 PROBLEM — O99.213 OBESITY COMPLICATING PREGNANCY IN THIRD TRIMESTER: Status: ACTIVE | Noted: 2021-08-21

## 2022-03-19 PROBLEM — O36.8130 DECREASED FETAL MOVEMENTS IN THIRD TRIMESTER: Status: ACTIVE | Noted: 2021-08-21

## 2022-03-20 PROBLEM — O47.9 UTERINE CONTRACTIONS DURING PREGNANCY: Status: ACTIVE | Noted: 2021-08-31

## 2023-05-14 RX ORDER — ACETAMINOPHEN 325 MG/1
650 TABLET ORAL EVERY 4 HOURS PRN
COMMUNITY
Start: 2021-09-02

## 2024-05-28 ENCOUNTER — HOSPITAL ENCOUNTER (EMERGENCY)
Facility: HOSPITAL | Age: 29
Discharge: HOME HEALTH | End: 2024-05-28
Attending: EMERGENCY MEDICINE
Payer: OTHER GOVERNMENT

## 2024-05-28 ENCOUNTER — APPOINTMENT (OUTPATIENT)
Facility: HOSPITAL | Age: 29
End: 2024-05-28
Payer: OTHER GOVERNMENT

## 2024-05-28 VITALS
HEIGHT: 68 IN | OXYGEN SATURATION: 100 % | BODY MASS INDEX: 31.11 KG/M2 | TEMPERATURE: 98.1 F | HEART RATE: 61 BPM | WEIGHT: 205.25 LBS | DIASTOLIC BLOOD PRESSURE: 80 MMHG | RESPIRATION RATE: 15 BRPM | SYSTOLIC BLOOD PRESSURE: 137 MMHG

## 2024-05-28 DIAGNOSIS — R22.0 LIP SWELLING: ICD-10-CM

## 2024-05-28 DIAGNOSIS — S09.90XA CLOSED HEAD INJURY, INITIAL ENCOUNTER: ICD-10-CM

## 2024-05-28 DIAGNOSIS — R68.84 MANDIBLE PAIN: ICD-10-CM
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
## 2024-05-28 DIAGNOSIS — S16.1XXA STRAIN OF NECK MUSCLE, INITIAL ENCOUNTER: ICD-10-CM

## 2024-05-28 LAB
BASOPHILS # BLD: 0 K/UL (ref 0–0.1)
BASOPHILS NFR BLD: 0 % (ref 0–1)
DIFFERENTIAL METHOD BLD: ABNORMAL
EOSINOPHIL # BLD: 0.1 K/UL (ref 0–0.4)
EOSINOPHIL NFR BLD: 1 % (ref 0–7)
ERYTHROCYTE [DISTWIDTH] IN BLOOD BY AUTOMATED COUNT: 13.2 % (ref 11.5–14.5)
HCT VFR BLD AUTO: 50.3 % (ref 35–47)
HGB BLD-MCNC: 16.6 G/DL (ref 11.5–16)
IMM GRANULOCYTES # BLD AUTO: 0 K/UL (ref 0–0.04)
IMM GRANULOCYTES NFR BLD AUTO: 0 % (ref 0–0.5)
LYMPHOCYTES # BLD: 2.3 K/UL (ref 0.8–3.5)
LYMPHOCYTES NFR BLD: 26 % (ref 12–49)
MCH RBC QN AUTO: 31.3 PG (ref 26–34)
MCHC RBC AUTO-ENTMCNC: 33 G/DL (ref 30–36.5)
MCV RBC AUTO: 94.9 FL (ref 80–99)
MONOCYTES # BLD: 0.5 K/UL (ref 0–1)
MONOCYTES NFR BLD: 6 % (ref 5–13)
NEUTS SEG # BLD: 5.8 K/UL (ref 1.8–8)
NEUTS SEG NFR BLD: 67 % (ref 32–75)
NRBC # BLD: 0 K/UL (ref 0–0.01)
NRBC BLD-RTO: 0 PER 100 WBC
PLATELET # BLD AUTO: 216 K/UL (ref 150–400)
PMV BLD AUTO: 10.3 FL (ref 8.9–12.9)
RBC # BLD AUTO: 5.3 M/UL (ref 3.8–5.2)
WBC # BLD AUTO: 8.7 K/UL (ref 3.6–11)

## 2024-05-28 PROCEDURE — 99285 EMERGENCY DEPT VISIT HI MDM: CPT

## 2024-05-28 PROCEDURE — 6360000004 HC RX CONTRAST MEDICATION: Performed by: STUDENT IN AN ORGANIZED HEALTH CARE EDUCATION/TRAINING PROGRAM

## 2024-05-28 PROCEDURE — 36415 COLL VENOUS BLD VENIPUNCTURE: CPT

## 2024-05-28 PROCEDURE — 85025 COMPLETE CBC W/AUTO DIFF WBC: CPT

## 2024-05-28 PROCEDURE — 70498 CT ANGIOGRAPHY NECK: CPT

## 2024-05-28 PROCEDURE — 70450 CT HEAD/BRAIN W/O DYE: CPT

## 2024-05-28 RX ORDER — METHOCARBAMOL 750 MG/1
750 TABLET, FILM COATED ORAL 4 TIMES DAILY
Qty: 40 TABLET | Refills: 0 | Status: SHIPPED | OUTPATIENT
Start: 2024-05-28 | End: 2024-06-07

## 2024-05-28 RX ORDER — HYDROCODONE BITARTRATE AND ACETAMINOPHEN 5; 325 MG/1; MG/1
1 TABLET ORAL EVERY 6 HOURS PRN
Qty: 6 TABLET | Refills: 0 | Status: SHIPPED | OUTPATIENT
Start: 2024-05-28 | End: 2024-05-31

## 2024-05-28 RX ORDER — LIDOCAINE 50 MG/G
1 PATCH TOPICAL DAILY
Qty: 10 PATCH | Refills: 0 | Status: SHIPPED | OUTPATIENT
Start: 2024-05-28 | End: 2024-06-07

## 2024-05-28 RX ADMIN — IOPAMIDOL 100 ML: 755 INJECTION, SOLUTION INTRAVENOUS at 11:58

## 2024-05-28 ASSESSMENT — PAIN DESCRIPTION - DESCRIPTORS: DESCRIPTORS: ACHING

## 2024-05-28 ASSESSMENT — PAIN DESCRIPTION - ORIENTATION: ORIENTATION: LEFT

## 2024-05-28 ASSESSMENT — PAIN DESCRIPTION - ONSET: ONSET: PROGRESSIVE

## 2024-05-28 ASSESSMENT — PAIN DESCRIPTION - FREQUENCY: FREQUENCY: CONTINUOUS

## 2024-05-28 ASSESSMENT — PAIN DESCRIPTION - LOCATION: LOCATION: FACE;NECK

## 2024-05-28 ASSESSMENT — PAIN DESCRIPTION - PAIN TYPE: TYPE: ACUTE PAIN

## 2024-05-28 ASSESSMENT — PAIN SCALES - GENERAL: PAINLEVEL_OUTOF10: 8

## 2024-05-28 ASSESSMENT — PAIN - FUNCTIONAL ASSESSMENT
PAIN_FUNCTIONAL_ASSESSMENT: 0-10
PAIN_FUNCTIONAL_ASSESSMENT: ACTIVITIES ARE NOT PREVENTED
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
